# Patient Record
Sex: FEMALE | Race: ASIAN | NOT HISPANIC OR LATINO | URBAN - METROPOLITAN AREA
[De-identification: names, ages, dates, MRNs, and addresses within clinical notes are randomized per-mention and may not be internally consistent; named-entity substitution may affect disease eponyms.]

---

## 2024-05-18 ENCOUNTER — APPOINTMENT (RX ONLY)
Dept: URBAN - METROPOLITAN AREA CLINIC 102 | Facility: CLINIC | Age: 54
Setting detail: DERMATOLOGY
End: 2024-05-18

## 2024-05-18 DIAGNOSIS — L01.01 NON-BULLOUS IMPETIGO: ICD-10-CM | Status: INADEQUATELY CONTROLLED

## 2024-05-18 DIAGNOSIS — R20.2 PARESTHESIA OF SKIN: ICD-10-CM

## 2024-05-18 DIAGNOSIS — D22 MELANOCYTIC NEVI: ICD-10-CM

## 2024-05-18 PROBLEM — D22.4 MELANOCYTIC NEVI OF SCALP AND NECK: Status: ACTIVE | Noted: 2024-05-18

## 2024-05-18 PROCEDURE — 99204 OFFICE O/P NEW MOD 45 MIN: CPT

## 2024-05-18 PROCEDURE — ? COUNSELING

## 2024-05-18 PROCEDURE — ? PRESCRIPTION

## 2024-05-18 RX ORDER — KETOCONAZOLE 20 MG/G
CREAM TOPICAL
Qty: 15 | Refills: 1 | Status: ERX | COMMUNITY
Start: 2024-05-18

## 2024-05-18 RX ORDER — TRIAMCINOLONE ACETONIDE 0.25 MG/G
CREAM TOPICAL
Qty: 15 | Refills: 0 | Status: ERX | COMMUNITY
Start: 2024-05-18

## 2024-05-18 RX ORDER — MUPIROCIN 20 MG/G
OINTMENT TOPICAL BID
Qty: 15 | Refills: 2 | Status: ERX | COMMUNITY
Start: 2024-05-18

## 2024-05-18 RX ADMIN — KETOCONAZOLE: 20 CREAM TOPICAL at 00:00

## 2024-05-18 RX ADMIN — TRIAMCINOLONE ACETONIDE: 0.25 CREAM TOPICAL at 00:00

## 2024-05-18 RX ADMIN — MUPIROCIN: 20 OINTMENT TOPICAL at 00:00

## 2024-05-18 ASSESSMENT — LOCATION SIMPLE DESCRIPTION DERM
LOCATION SIMPLE: LEFT ANTERIOR NECK
LOCATION SIMPLE: RIGHT THIGH
LOCATION SIMPLE: LEFT UPPER BACK
LOCATION SIMPLE: LEFT THIGH

## 2024-05-18 ASSESSMENT — LOCATION ZONE DERM
LOCATION ZONE: LEG
LOCATION ZONE: TRUNK
LOCATION ZONE: NECK

## 2024-05-18 ASSESSMENT — LOCATION DETAILED DESCRIPTION DERM
LOCATION DETAILED: LEFT ANTERIOR PROXIMAL THIGH
LOCATION DETAILED: LEFT SUPERIOR MEDIAL UPPER BACK
LOCATION DETAILED: LEFT INFERIOR ANTERIOR NECK
LOCATION DETAILED: RIGHT ANTERIOR PROXIMAL THIGH

## 2024-06-03 ENCOUNTER — APPOINTMENT (RX ONLY)
Dept: URBAN - METROPOLITAN AREA CLINIC 102 | Facility: CLINIC | Age: 54
Setting detail: DERMATOLOGY
End: 2024-06-03

## 2024-06-03 DIAGNOSIS — L24 IRRITANT CONTACT DERMATITIS: ICD-10-CM | Status: INADEQUATELY CONTROLLED

## 2024-06-03 DIAGNOSIS — L01.01 NON-BULLOUS IMPETIGO: ICD-10-CM | Status: RESOLVED

## 2024-06-03 PROBLEM — L24.9 IRRITANT CONTACT DERMATITIS, UNSPECIFIED CAUSE: Status: ACTIVE | Noted: 2024-06-03

## 2024-06-03 PROCEDURE — ? PRESCRIPTION MEDICATION MANAGEMENT

## 2024-06-03 PROCEDURE — ? COUNSELING

## 2024-06-03 PROCEDURE — 99213 OFFICE O/P EST LOW 20 MIN: CPT

## 2024-06-03 ASSESSMENT — LOCATION DETAILED DESCRIPTION DERM
LOCATION DETAILED: LEFT ANTERIOR PROXIMAL THIGH
LOCATION DETAILED: RIGHT ANTERIOR PROXIMAL THIGH

## 2024-06-03 ASSESSMENT — LOCATION SIMPLE DESCRIPTION DERM
LOCATION SIMPLE: LEFT THIGH
LOCATION SIMPLE: RIGHT THIGH

## 2024-06-03 ASSESSMENT — LOCATION ZONE DERM: LOCATION ZONE: LEG

## 2024-06-03 NOTE — PROCEDURE: PRESCRIPTION MEDICATION MANAGEMENT
Render In Strict Bullet Format?: No
Detail Level: Zone
Continue Regimen: triamcinolone acetonide 0.025 % topical cream\\nAAA increase to TID X 2 weeks, take a week off, repeat prn flares
Continue Regimen: mupirocin 2 % topical ointment BID\\nAAA QHS X 1 week as needed if symptoms return\\n\\nketoconazole 2 % topical cream\\nAAA BID as needed if symptoms return

## 2024-07-01 ENCOUNTER — APPOINTMENT (RX ONLY)
Dept: URBAN - METROPOLITAN AREA CLINIC 102 | Facility: CLINIC | Age: 54
Setting detail: DERMATOLOGY
End: 2024-07-01

## 2024-07-01 DIAGNOSIS — D49.2 NEOPLASM OF UNSPECIFIED BEHAVIOR OF BONE, SOFT TISSUE, AND SKIN: ICD-10-CM

## 2024-07-01 PROCEDURE — 11104 PUNCH BX SKIN SINGLE LESION: CPT

## 2024-07-01 PROCEDURE — ? BIOPSY BY PUNCH METHOD

## 2024-07-01 ASSESSMENT — LOCATION DETAILED DESCRIPTION DERM: LOCATION DETAILED: LEFT ANTERIOR PROXIMAL THIGH

## 2024-07-01 ASSESSMENT — LOCATION ZONE DERM: LOCATION ZONE: LEG

## 2024-07-01 ASSESSMENT — LOCATION SIMPLE DESCRIPTION DERM: LOCATION SIMPLE: LEFT THIGH

## 2024-07-01 NOTE — PROCEDURE: BIOPSY BY PUNCH METHOD

## 2024-12-13 ENCOUNTER — APPOINTMENT (OUTPATIENT)
Dept: URBAN - METROPOLITAN AREA CLINIC 101 | Facility: CLINIC | Age: 54
Setting detail: DERMATOLOGY
End: 2024-12-13

## 2024-12-13 VITALS — HEIGHT: 64 IN | WEIGHT: 165 LBS

## 2024-12-13 DIAGNOSIS — Z41.1 ENCOUNTER FOR COSMETIC SURGERY: ICD-10-CM

## 2024-12-13 PROCEDURE — ? DVT RISK ASSESSMENT

## 2024-12-13 PROCEDURE — ? ADDITIONAL NOTES

## 2024-12-13 NOTE — PROCEDURE: ADDITIONAL NOTES
Render Risk Assessment In Note?: no
Additional Notes: Patient's concerns include: \\n-  Desire for improved abdominal contour \\n- Desires breast lift (not interested in implants) \\n\\n\\nFactors altering surgical decisions (hx/exam findings): \\n- Bilateral breast grade 3 ptosis with superior pole hollowness \\n- Moderate abdominal adiposity and skin laxity with palpable rectus diastasis present\\n- Lipodystrophy of bilateral flanks \\n- Healed  scar\\n- Healthy, non-smoker\\n- Last mammogram was in  with no concerning findings per the patient \\n\\n\\nProposed intervention(s):\\n- Recommend bilateral wise pattern mastopexy and extended abdominoplasty with repair of rectus diastasis and liposuction of bilateral flanks. She does require the extended abdominoplasty incision given the skin excess and laxity extending laterally. Discussed limitations of surgery including inability to remove all stretch marks or correct all degrees of abdominal laxity or fullness, which the patient understands. The patient will require updated mammogram and medical clearance with labs prior to surgery and discussed that being at the goal weight prior to surgery can optimize the results. \\n- Discussed the extended abdominoplasty with rectus diastasis repair and liposuction of bilateral flanks in detail. Discussed incision patterns and postop activity restrictions for 6 weeks, early ambulation required, drain care, need to remain flexed at waist/beach chair position for 7 days after surgery before gradually straightening up, and use of postop compression x6 weeks. Discussed risks of trunk contouring surgery including Infection, bleeding, scar, perforation of deeper structures, need for future surgery, damage to nearby structures, contour irregularities, seroma, hematoma, skin necrosis, postop deformity- dog ear, pain, DVT/PE, wound care, asymmetry, numbness/sensory changes, edema, lymphedema, poor cosmetic result, loss of umbilicus, abdominal bulging, inadequate skin retraction, poor scarring, and persistent deformity\\n- Discussed the mastopexy procedure and incision patterns in detail, which will be a wise pattern mastopexy. Discussed that following a mastopexy the overall breast volume will be decreased and there can be upper pole hollowness. Discussed limitations of surgery including postoperative tissue relaxation, which the patient understands. Discussed possibility of requiring a revision in the future and the impact of different factors like weight fluctuation on the final surgical results. Discussed postop care including 6 weeks of activity restrictions and use of surgical bra. Discussed risks of mastopexy surgery including infection, bleeding, scar, damage to nearby structures, need for future surgery, hematoma, seroma, asymmetry, loss of NAC, sensory change of NAC, skin necrosis, delayed wound healing, inability to breast feed, recurrent ptosis, no guarantee of cup size, pain, poor scarring, and poor cosmetic result. Patient will be seen on POD#1 for a hematoma check and to be placed into a bra. \\n- Plan for VTE prophylaxis with use of intermittent pneumatic leg compression devices and early ambulation.\\n- Discussed postop pain control including optional use of Exparel, which is a long acting local anesthetic injected at the time of surgery\\n- The patient was provided with pricing info for the procedures discussed as well as Exparel, but did recommend waiting on surgery until she is closer to her goal weight.\\n- Updated mammogram and medical clearance with labs needed prior to surgery \\n\\n\\n\\n\\nPre-op Counseling:\\n- Aesthetic Abdominal Deformity Care: Abdominal cosmetic dissatisfaction may be due to excess skin, stretch marks, bulging, fat excess, muscle weakness, and other complaints. Abdominoplasty, liposuction and other body contouring techniques are performed to help correct these issues. Surgery is commonly performed on an outpatient basis, although overnight hospitalization may be indicated in some patients, particularly those undergoing large body contouring operations. Abdominal aesthetic deformities may improve somewhat with diet control, exercise, rest, and proper skin care, including avoidance of excess sun and abstinence from nicotine. Specific preoperative and postoperative instructions will be provided for surgery.\\n- Expectations: Abdominal aesthetic concerns may be the result of obesity or overweight, pregnancy, genetic factors, sun damage, prior surgery, hernias, and other factors. Aesthetic surgery for abdominal concerns is generally not performed for the purposes of weight loss. Rather, overweight patients are advised to lose weight in a controlled, supervised manner until a maintainable plateau weight is achieved before undergoing abdominal body contouring operations, in order to optimize results and reduce surgical risks. Abdominoplasty may not correct wrinkling, roundness, or all degrees of laxity or fullness on the abdomen. Liposuction can also performed for contouring purposes. Skin retraction may not be complete with liposuction, and excess skin may require surgical removal for full correction. Use of garments after surgery is advised and instructions will be provided. Risks, benefits, expectations and alternatives to abdominoplasty and liposuction have been explained in detail, including, but not limited to, the risks of infection, bleeding, injury to nerves or abdominal organs, hernia, abdominal bulging, contour irregularities, inadequate skin retraction, persistent deformity, seromas, deep venous thrombosis, pulmonary embolism, fat embolism, scarring, delayed healing, loss of the belly button, and other risks. No guarantee or warranty regarding cosmetic outcome or longevity of results was given or implied.\\n- Aesthetic Breast Deformity Care: Breast cosmetic dissatisfaction may be due to volume inadequacy, volume excess, breast descent (sagging), excess skin, nipple distortion, scarring, asymmetry, and other complaints. Breast aesthetics are the result of complex interplay between the skin envelope, the nipple position, the volume and projection of the breasts, the chest wall anatomy, age, hormonal status, and other factors. Clear articulation of patient aesthetic goals is important to help the surgeon meet patient expectations. Photos of desired results may be informative but can not be fully relied upon as a guide for surgery. No guarantee has been made or implied regarding matching final results to photographic examples or specific cup sizes. Cosmetic breast surgery is commonly performed on an outpatient basis, although overnight hospitalization may be indicated in some patients, particularly those undergoing large body contouring operations. Breast enlargement may improve somewhat with diet control, exercise, and proper care, including adequate supportive breast garments, avoidance of excess sun to the breast skin, and abstinence from nicotine. Specific preoperative and postoperative instructions will be provided for surgery. Breast implants are commonly used for enhancement of the breast. Risks associated with implant use are enumerated in the specific counseling blocks below. Fat transfer to the breast area is commonly done by combining liposuction of problem areas with injection of the suctioned fat into the breasts to fill contour defects, or to create a huddleston, rounder look. Irregularity, asymmetry, and loss of volume in augmented areas may still occur, and aging changes in the breast will continue as the patient gets older. \\n- Expectations: Breast surgery is typically done under general anesthesia, although some patients may undergo surgery under sedation with local anesthesia. Healing typically takes anywhere between 3 to 6 weeks for incisions and several more weeks are often required for settling of the breasts. Scars may mature over the course of 6-12 months.